# Patient Record
(demographics unavailable — no encounter records)

---

## 2024-10-29 NOTE — DATA REVIEWED
[de-identified] : An audiogram was ordered and performed including tympanometry, pure tones and speech, for patient's complaint of L epidermoid/hearing loss I have independently reviewed the patient's audiogram from today and my findings include L mixed loss, still with preservation of bone line on L [de-identified] : MRI with postop changes, no residual disease

## 2024-10-29 NOTE — PHYSICAL EXAM
[Hearing Loss Right Only] : normal [Hearing Loss Left Only] : normal [Roberts Test Lateralizes To Left] : tone lateralization to the left [Fukuda Step Test] : Fukuda Step Test was Positive [Romberg's Sign] : Romberg's sign was absent [de-identified] : post surgical changes [Normal] : no rashes [de-identified] : L facial nerve with asymmetry, worse in lower division HB 2-3/6

## 2024-10-29 NOTE — HISTORY OF PRESENT ILLNESS
[de-identified] : 19 year old boy presents for follow up evaluation. 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 States doing well - No pain, drainage, bleeding or fevers. Patient feels that hearing worsened following the procedure and has not recovered.  MR IAC 10/18/24 IMPRESSION: 1. Majority of cholesteatoma petrous apex is been resected. Suspect small amounts of residual cholesteatoma inferior to the resection cavity in the superior part of the left petrous apex. 2. 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 3. Redemonstration of blood products in the left mastoid air cells, which appears stable to the prior study or slightly reduced. 4. Relief of the previously seen scalp and facial swelling and the previously seen extra-axial collection. At this time, no midline shift or acute intracranial abnormality.

## 2025-01-16 NOTE — PHYSICAL EXAM
[Midline] : trachea located in midline position [Normal] : the left mastoid was normal [Roberts Test Lateralizes To Left] : tone lateralization to the left [Fukuda Step Test] : Fukuda Step Test was Positive [de-identified] : L facial nerve with asymmetry, worse in lower division HB 2-3/6 [Hearing Loss Right Only] : normal [Hearing Loss Left Only] : normal [Romberg's Sign] : Romberg's sign was absent [de-identified] : post surgical changes

## 2025-01-16 NOTE — DATA REVIEWED
[de-identified] : An audiogram was ordered and performed including tympanometry, pure tones and speech, for patient's complaint of L epidermoid/hearing loss I have independently reviewed the patient's audiogram from today and my findings include L mixed loss, still with preservation of bone line on L [de-identified] : MRI with postop changes, no residual disease

## 2025-01-16 NOTE — PHYSICAL EXAM
[Midline] : trachea located in midline position [Normal] : the left mastoid was normal [Roberts Test Lateralizes To Left] : tone lateralization to the left [Fukuda Step Test] : Fukuda Step Test was Positive [de-identified] : L facial nerve with asymmetry, worse in lower division HB 2-3/6 [Hearing Loss Right Only] : normal [Hearing Loss Left Only] : normal [Romberg's Sign] : Romberg's sign was absent [de-identified] : post surgical changes

## 2025-01-16 NOTE — DATA REVIEWED
[de-identified] : An audiogram was ordered and performed including tympanometry, pure tones and speech, for patient's complaint of L epidermoid/hearing loss I have independently reviewed the patient's audiogram from today and my findings include L mixed loss, still with preservation of bone line on L [de-identified] : MRI with postop changes, no residual disease

## 2025-01-16 NOTE — HISTORY OF PRESENT ILLNESS
[de-identified] : 19 year old boy presents for follow up evaluation. 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 Reports hearing has remained the same. Did not get hearing aids Denies otalgia, otorrhea, ear infections, fevers.